# Patient Record
(demographics unavailable — no encounter records)

---

## 2025-05-02 NOTE — ASSESSMENT
[FreeTextEntry1] : Patient is a 40 year old female with a middle phalanx fracture of her left small finger.  Reviewed xrays with the patient. Explained that the fractured bone has already healed and therefore, operative intervention is not recommended at this time. Due to stiffness for the splint she was previously wearing, patient endorses stiffness. Recommended hand therapy for finger mobility. Follow up as needed. I discussed that surgical procedure would involve re-breaking bone in order to align properly, and would require intense recovery for 8-10 weeks. If she would like to pursue this option we can discuss further.

## 2025-05-02 NOTE — PHYSICAL EXAM
[de-identified] : Constitutional: Alert and in no acute distress. Psychiatric: Oriented to person, place, and time. Insight and judgement were intact and the affect was normal. Cardiovascular: Regular rate assessed through peripheral pulses. Pulmonary: Nonlabored breathing on room air. Lymphatics: No peripheral lymphadenopathy appreciated.   Musculoskeletal:  Skin is intact. There is tenderness to palpation over the left small finger with deformity and swelling. Maximal tenderness is over the PIP and middle phalanx. There is no malrotation or scissoring of the injured digit. Remainder of the extremity is atraumatic. There is no tenderness over the volar tubercle of the scaphoid or anatomic snuffbox.  Mobility is full about the shoulders and elbows and wrists. Digital flexion and extension is limited on left. Index finger is 0 cm to distal palmar crease.  Middle finger is 0 cm to distal palmar crease.  Ring finger is 0 cm to distal palmar crease.  Small finger is 0 cm to distal palmar crease. Thumb opposition is full to the base of the small fingers bilaterally.   Sensation is intact to light touch in the ulnar, median, and radial nerve distributions. Motor is intact in the ulnar, median, and radial nerve distributions. Fingers are pink and well perfused. Capillary refill is brisk. [de-identified] : 3 View xray of the left small finger obtained and reviewed from today.  Evidence of malunited middle phalanx with PIP joint minimally involved.

## 2025-05-02 NOTE — PROCEDURE
[FreeTextEntry1] : We will have the patient begin bre straps to regain motion. Proper bre taping technique was demonstrated to the patient today.

## 2025-05-02 NOTE — PHYSICAL EXAM
[de-identified] : Constitutional: Alert and in no acute distress. Psychiatric: Oriented to person, place, and time. Insight and judgement were intact and the affect was normal. Cardiovascular: Regular rate assessed through peripheral pulses. Pulmonary: Nonlabored breathing on room air. Lymphatics: No peripheral lymphadenopathy appreciated.   Musculoskeletal:  Skin is intact. There is tenderness to palpation over the left small finger with deformity and swelling. Maximal tenderness is over the PIP and middle phalanx. There is no malrotation or scissoring of the injured digit. Remainder of the extremity is atraumatic. There is no tenderness over the volar tubercle of the scaphoid or anatomic snuffbox.  Mobility is full about the shoulders and elbows and wrists. Digital flexion and extension is limited on left. Index finger is 0 cm to distal palmar crease.  Middle finger is 0 cm to distal palmar crease.  Ring finger is 0 cm to distal palmar crease.  Small finger is 0 cm to distal palmar crease. Thumb opposition is full to the base of the small fingers bilaterally.   Sensation is intact to light touch in the ulnar, median, and radial nerve distributions. Motor is intact in the ulnar, median, and radial nerve distributions. Fingers are pink and well perfused. Capillary refill is brisk. [de-identified] : 3 View xray of the left small finger obtained and reviewed from today.  Evidence of malunited middle phalanx with PIP joint minimally involved.

## 2025-05-02 NOTE — HISTORY OF PRESENT ILLNESS
[Left] : left hand dominant [FreeTextEntry1] : Patient presents today for evaluation of her left small finger. She injured it on 3/17/25 after falling. She went to the ED the next day where xray confirmed a fracture, as per the patient. She was placed in an allumafoam splint and told to follow up with a hand and wrist surgeon. She reports moderate pain at the PIP joint and states that she has been wearing the finger splint since the ED visit. she denies any N/T.  She denies any significant PMHx. She has NKDA.